# Patient Record
Sex: FEMALE | Race: WHITE | NOT HISPANIC OR LATINO | Employment: FULL TIME | URBAN - METROPOLITAN AREA
[De-identification: names, ages, dates, MRNs, and addresses within clinical notes are randomized per-mention and may not be internally consistent; named-entity substitution may affect disease eponyms.]

---

## 2022-09-14 ENCOUNTER — DOCUMENTATION ONLY (OUTPATIENT)
Dept: TRANSPLANT | Facility: CLINIC | Age: 32
End: 2022-09-14

## 2022-09-19 ENCOUNTER — DOCUMENTATION ONLY (OUTPATIENT)
Dept: TRANSPLANT | Facility: CLINIC | Age: 32
End: 2022-09-19

## 2022-09-22 ENCOUNTER — TELEPHONE (OUTPATIENT)
Dept: TRANSPLANT | Facility: CLINIC | Age: 32
End: 2022-09-22

## 2022-09-22 NOTE — TELEPHONE ENCOUNTER
Initial Independent Living Donor Advocate contact made with potential donor today.  I introduced myself and my role during the donation process, includin.  KHALIDA ROLE   The federal government requires that all licensed transplant centers provide the living donor with an Independent Living Donor Advocate (KHALIDA).  I do not meet recipients or attend meetings that discuss their care or decision to transplant them. My role is separate to avoid any conflict of interest.  My role is to ensure:  1) your rights are protected;  2) you get all the information you need from the transplant team to make a fully informed decision whether to donate;   3) that living donation is in your best interest.   4) that you have the right to decide NOT to go forward with living donation at any time during this process.  I am available to you throughout the workup, during surgery phase and follow-up at home.   2. WORKUP & PRIVACY     Your identity and workup are not shared with the recipient at any time.     There is a medical donor workup that consists of testing to determine if you are healthy enough to donate.  Workup tests include many blood draws, urine collection/ (kidney function testing), chest x-ray, EKG, CT scan. As you complete each step then you may move on to the next.  Workup can take as little or as long as you need and you can stop the process at any time.     Transplant is a treatment option, not a cure. A kidney from a living kidney donor can last 12-14 years.  Other treatment options are  donation and two types of dialysis.     This is major surgery and your estimated hospital stay is approximately 1-2 nights.  After surgery, there are driving and lifting restrictions - no driving for two weeks and no lifting over ten pounds for 8 weeks.  Donors are routinely off from work for 4 - 6 weeks after surgery, and potentially longer if they have a physical job.       If you anticipate lost wages due to donation, donor  wage reimbursement options may be available to you and will be reviewed with you during the evaluation process.      The recipient's insurance covers the medical expenses related to the donor evaluation and surgery.  However, it is important for you to carry your own health insurance to address any medical issues that are found and are NOT related to living donation.  3.  QUESTIONS  Have you received a packet from the transplant department?     Questions?    Have you discussed with anyone your potential decision to donate?   Yes.  Is anyone pressuring or coercing you to donate? No.  Have you discussed any financial arrangements with recipient around donating a kidney? No.  Are you aware that you can confidentially opt out at any time, up to and including day of donation? Yes.  At this time, would you like to proceed with the medical evaluation to see if you can be a kidney donor?  Yes.    If yes, the donor coordinator will be reaching out to you with next steps.     You can reach me or someone else on the KHALIDA team by calling [phone] Option 3.    KHALIDA NOTES: Lian wants to give co-worker and friend.  She is scheduled to talk to Daisy Guillory RN, her donor nurse coordinator at 1 pm on Monday Oct. 3    Duration of call 25 minutes

## 2022-10-03 ENCOUNTER — TELEPHONE (OUTPATIENT)
Dept: TRANSPLANT | Facility: CLINIC | Age: 32
End: 2022-10-03

## 2022-10-14 ENCOUNTER — DOCUMENTATION ONLY (OUTPATIENT)
Dept: TRANSPLANT | Facility: CLINIC | Age: 32
End: 2022-10-14

## 2022-10-14 ENCOUNTER — TELEPHONE (OUTPATIENT)
Dept: TRANSPLANT | Facility: CLINIC | Age: 32
End: 2022-10-14

## 2022-10-14 NOTE — TELEPHONE ENCOUNTER
LM for Lian asking her if she'd like to resched the RN phone visit.Let me know either way and we can sched appt.

## 2022-10-24 ENCOUNTER — TELEPHONE (OUTPATIENT)
Dept: TRANSPLANT | Facility: CLINIC | Age: 32
End: 2022-10-24

## 2022-10-24 DIAGNOSIS — Z00.5 TRANSPLANT DONOR EVALUATION: Primary | ICD-10-CM

## 2022-10-24 RX ORDER — MEPERIDINE HYDROCHLORIDE 25 MG/ML
25 INJECTION INTRAMUSCULAR; INTRAVENOUS; SUBCUTANEOUS EVERY 30 MIN PRN
Status: CANCELLED | OUTPATIENT
Start: 2022-12-01

## 2022-10-24 RX ORDER — ALBUTEROL SULFATE 90 UG/1
1-2 AEROSOL, METERED RESPIRATORY (INHALATION)
Status: CANCELLED
Start: 2022-12-01

## 2022-10-24 RX ORDER — ALBUTEROL SULFATE 0.83 MG/ML
2.5 SOLUTION RESPIRATORY (INHALATION)
Status: CANCELLED | OUTPATIENT
Start: 2022-12-01

## 2022-10-24 RX ORDER — EPINEPHRINE 1 MG/ML
0.3 INJECTION, SOLUTION, CONCENTRATE INTRAVENOUS EVERY 5 MIN PRN
Status: CANCELLED | OUTPATIENT
Start: 2022-12-01

## 2022-10-24 RX ORDER — DIPHENHYDRAMINE HYDROCHLORIDE 50 MG/ML
50 INJECTION INTRAMUSCULAR; INTRAVENOUS
Status: CANCELLED
Start: 2022-12-01

## 2022-10-24 RX ORDER — METHYLPREDNISOLONE SODIUM SUCCINATE 125 MG/2ML
125 INJECTION, POWDER, LYOPHILIZED, FOR SOLUTION INTRAMUSCULAR; INTRAVENOUS
Status: CANCELLED
Start: 2022-12-01

## 2022-10-24 NOTE — TELEPHONE ENCOUNTER
Contacted Lian Garcia to introduce myself and my role, review of medical/surgical/family history and next steps.     Lian Garcia  is aware She can stop donor evaluation at any time.    Have you ever been positive for COVID 19? Yes, oct 2020    Have you received the COVID vaccination series? yes     Reviewed risk of COVID-19 to living donors.    Regular blood donor? No Last blood donation date N/A (Notified donor to avoid blood donation at this time to get accurate blood counts if going through evaluation)     Lian Garcia is a 31 year old female  ABO unknown that would like to learn more about donation to a friend.     Concerns from medical/surgical/family history: none    Current medications and NSAID use:   Sertaline 100 mg daily  Spironolactone 25 mg daily -skin    Legal issues w/ law enforcement: none    Reviewed any history of travel in endemic areas: [place], yes  Strongyloides- [place], [place] and [place].  Trypanosoma cruzi ([place])- [place]  West Nile Virus- [place], [place], [place], [place] and [place].     Per our Phase 1 algorithm, does not meet criteria to do preliminary testing.     Reviewed evaluation testing: Iohexol, Lab work, CXR, EKG, Provider visits and functions, CT Angiogram.     Reviewed operations of selection committee and angio review meetings and the need for multidisciplinary input. Post-donation requirements include post-op appointment with your surgeon at 2 weeks after surgery, 6 week, 6 month, 1 year and 2 year lab tests.     Reviewed NKR listing and transfer of care to KPD team if approved. Provided Lian with NKR website to review.     Briefly went over options if approved of NDD and voucher donation.     Lian would like to proceed with next steps: rene     Confirmed that Lian reviewed Informed consent document and all questions answered.  Reviewed that they will receive Docusign to obtain electronic signature for the following: Informed consent, SRTR data,  RHIANNON for medical information, Auth for Electronic communication and will need their signed consent back before proceeding with evaluation.      Encouraged sign up for MyChart and reviewed importance of watching teaching videos prior to evaluation.    Verified recipient status if not NDD.    Donor timeline: TBD     Will send orders to scheduling team to set up for eval testing.

## 2022-11-30 LAB
A1 AG RBC QL: NEGATIVE
SPECIMEN EXPIRATION DATE: NORMAL

## 2022-12-01 ENCOUNTER — OFFICE VISIT (OUTPATIENT)
Dept: INFUSION THERAPY | Facility: CLINIC | Age: 32
End: 2022-12-01
Attending: INTERNAL MEDICINE

## 2022-12-01 ENCOUNTER — ALLIED HEALTH/NURSE VISIT (OUTPATIENT)
Dept: TRANSPLANT | Facility: CLINIC | Age: 32
End: 2022-12-01
Attending: INTERNAL MEDICINE

## 2022-12-01 ENCOUNTER — ANCILLARY PROCEDURE (OUTPATIENT)
Dept: GENERAL RADIOLOGY | Facility: CLINIC | Age: 32
End: 2022-12-01
Attending: INTERNAL MEDICINE

## 2022-12-01 ENCOUNTER — ANCILLARY PROCEDURE (OUTPATIENT)
Dept: CT IMAGING | Facility: CLINIC | Age: 32
End: 2022-12-01
Attending: INTERNAL MEDICINE

## 2022-12-01 ENCOUNTER — APPOINTMENT (OUTPATIENT)
Dept: TRANSPLANT | Facility: CLINIC | Age: 32
End: 2022-12-01
Attending: INTERNAL MEDICINE

## 2022-12-01 ENCOUNTER — LAB (OUTPATIENT)
Dept: LAB | Facility: CLINIC | Age: 32
End: 2022-12-01
Attending: INTERNAL MEDICINE

## 2022-12-01 ENCOUNTER — OFFICE VISIT (OUTPATIENT)
Dept: TRANSPLANT | Facility: CLINIC | Age: 32
End: 2022-12-01
Attending: INTERNAL MEDICINE

## 2022-12-01 ENCOUNTER — OFFICE VISIT (OUTPATIENT)
Dept: NEPHROLOGY | Facility: CLINIC | Age: 32
End: 2022-12-01
Attending: INTERNAL MEDICINE

## 2022-12-01 VITALS
OXYGEN SATURATION: 98 % | WEIGHT: 174 LBS | SYSTOLIC BLOOD PRESSURE: 104 MMHG | HEIGHT: 67 IN | BODY MASS INDEX: 27.31 KG/M2 | HEART RATE: 69 BPM | DIASTOLIC BLOOD PRESSURE: 67 MMHG

## 2022-12-01 VITALS
HEART RATE: 68 BPM | OXYGEN SATURATION: 99 % | SYSTOLIC BLOOD PRESSURE: 116 MMHG | RESPIRATION RATE: 16 BRPM | WEIGHT: 174.1 LBS | HEIGHT: 67 IN | TEMPERATURE: 97.6 F | BODY MASS INDEX: 27.33 KG/M2 | DIASTOLIC BLOOD PRESSURE: 77 MMHG

## 2022-12-01 DIAGNOSIS — Z00.5 TRANSPLANT DONOR EVALUATION: ICD-10-CM

## 2022-12-01 DIAGNOSIS — G43.109 MIGRAINE WITH AURA AND WITHOUT STATUS MIGRAINOSUS, NOT INTRACTABLE: ICD-10-CM

## 2022-12-01 DIAGNOSIS — Z72.0 TOBACCO ABUSE: ICD-10-CM

## 2022-12-01 DIAGNOSIS — Z00.5 TRANSPLANT DONOR EVALUATION: Primary | ICD-10-CM

## 2022-12-01 DIAGNOSIS — E78.00 PURE HYPERCHOLESTEROLEMIA: ICD-10-CM

## 2022-12-01 DIAGNOSIS — R51.9 RECURRENT HEADACHE: ICD-10-CM

## 2022-12-01 LAB
ABO/RH(D): NORMAL
ABO/RH(D): NORMAL
ALBUMIN MFR UR ELPH: 13.1 MG/DL
ALBUMIN SERPL BCG-MCNC: 4.3 G/DL (ref 3.5–5.2)
ALBUMIN UR-MCNC: 10 MG/DL
ALP SERPL-CCNC: 58 U/L (ref 35–104)
ALT SERPL W P-5'-P-CCNC: 14 U/L (ref 10–35)
ANION GAP SERPL CALCULATED.3IONS-SCNC: 8 MMOL/L (ref 7–15)
ANTIBODY SCREEN: NORMAL
APPEARANCE UR: CLEAR
APTT PPP: 26 SECONDS (ref 22–38)
AST SERPL W P-5'-P-CCNC: 15 U/L (ref 10–35)
BILIRUB SERPL-MCNC: 0.2 MG/DL
BILIRUB UR QL STRIP: NEGATIVE
BLOOD BANK CHART COMMENT: NORMAL
BUN SERPL-MCNC: 12.3 MG/DL (ref 6–20)
CALCIUM SERPL-MCNC: 8.9 MG/DL (ref 8.6–10)
CHLORIDE SERPL-SCNC: 103 MMOL/L (ref 98–107)
CHOLEST SERPL-MCNC: 216 MG/DL
CMV IGG SERPL IA-ACNC: <0.2 U/ML
CMV IGG SERPL IA-ACNC: NORMAL
COLOR UR AUTO: YELLOW
CREAT SERPL-MCNC: 0.7 MG/DL (ref 0.51–0.95)
CREAT UR-MCNC: 250 MG/DL
CREAT UR-MCNC: 250 MG/DL
DEPRECATED HCO3 PLAS-SCNC: 27 MMOL/L (ref 22–29)
EBV VCA IGG SER IA-ACNC: >750 U/ML
EBV VCA IGG SER IA-ACNC: POSITIVE
ERYTHROCYTE [DISTWIDTH] IN BLOOD BY AUTOMATED COUNT: 11.8 % (ref 10–15)
GFR SERPL CREATININE-BSD FRML MDRD: >90 ML/MIN/1.73M2
GLUCOSE SERPL-MCNC: 94 MG/DL (ref 70–99)
GLUCOSE UR STRIP-MCNC: NEGATIVE MG/DL
HBA1C MFR BLD: 5.1 %
HBV CORE AB SERPL QL IA: NONREACTIVE
HBV SURFACE AB SERPL IA-ACNC: 353.96 M[IU]/ML
HBV SURFACE AB SERPL IA-ACNC: REACTIVE M[IU]/ML
HBV SURFACE AG SERPL QL IA: NONREACTIVE
HCT VFR BLD AUTO: 44.7 % (ref 35–47)
HCV AB SERPL QL IA: NONREACTIVE
HDLC SERPL-MCNC: 67 MG/DL
HGB BLD-MCNC: 15 G/DL (ref 11.7–15.7)
HGB UR QL STRIP: NEGATIVE
HIV 1+2 AB+HIV1 P24 AG SERPL QL IA: NONREACTIVE
INR PPP: 0.95 (ref 0.85–1.15)
KETONES UR STRIP-MCNC: NEGATIVE MG/DL
LDLC SERPL CALC-MCNC: 133 MG/DL
LEUKOCYTE ESTERASE UR QL STRIP: NEGATIVE
MCH RBC QN AUTO: 30.1 PG (ref 26.5–33)
MCHC RBC AUTO-ENTMCNC: 33.6 G/DL (ref 31.5–36.5)
MCV RBC AUTO: 90 FL (ref 78–100)
MICROALBUMIN UR-MCNC: <12 MG/L
MICROALBUMIN/CREAT UR: NORMAL MG/G{CREAT}
MUCOUS THREADS #/AREA URNS LPF: PRESENT /LPF
NITRATE UR QL: NEGATIVE
NONHDLC SERPL-MCNC: 149 MG/DL
PH UR STRIP: 5.5 [PH] (ref 5–7)
PHOSPHATE SERPL-MCNC: 3 MG/DL (ref 2.5–4.5)
PLATELET # BLD AUTO: 353 10E3/UL (ref 150–450)
POTASSIUM SERPL-SCNC: 4.1 MMOL/L (ref 3.4–5.3)
PROT SERPL-MCNC: 7.3 G/DL (ref 6.4–8.3)
PROT/CREAT 24H UR: 0.05 MG/MG CR (ref 0–0.2)
RBC # BLD AUTO: 4.99 10E6/UL (ref 3.8–5.2)
RBC URINE: <1 /HPF
SODIUM SERPL-SCNC: 138 MMOL/L (ref 136–145)
SP GR UR STRIP: 1.03 (ref 1–1.03)
SPECIMEN EXPIRATION DATE: NORMAL
SPECIMEN EXPIRATION DATE: NORMAL
SQUAMOUS EPITHELIAL: 4 /HPF
T PALLIDUM AB SER QL: NONREACTIVE
TRIGL SERPL-MCNC: 79 MG/DL
URATE SERPL-MCNC: 3.1 MG/DL (ref 2.4–5.7)
UROBILINOGEN UR STRIP-MCNC: NORMAL MG/DL
WBC # BLD AUTO: 6.9 10E3/UL (ref 4–11)
WBC URINE: 2 /HPF

## 2022-12-01 PROCEDURE — 81001 URINALYSIS AUTO W/SCOPE: CPT

## 2022-12-01 PROCEDURE — 80053 COMPREHEN METABOLIC PANEL: CPT

## 2022-12-01 PROCEDURE — 86789 WEST NILE VIRUS ANTIBODY: CPT

## 2022-12-01 PROCEDURE — 86803 HEPATITIS C AB TEST: CPT

## 2022-12-01 PROCEDURE — 86644 CMV ANTIBODY: CPT

## 2022-12-01 PROCEDURE — 83036 HEMOGLOBIN GLYCOSYLATED A1C: CPT

## 2022-12-01 PROCEDURE — 85730 THROMBOPLASTIN TIME PARTIAL: CPT

## 2022-12-01 PROCEDURE — 87340 HEPATITIS B SURFACE AG IA: CPT

## 2022-12-01 PROCEDURE — 84100 ASSAY OF PHOSPHORUS: CPT

## 2022-12-01 PROCEDURE — 99207 PR NO CHARGE COORDINATED CARE PS: CPT | Performed by: SOCIAL WORKER

## 2022-12-01 PROCEDURE — 86665 EPSTEIN-BARR CAPSID VCA: CPT

## 2022-12-01 PROCEDURE — 84156 ASSAY OF PROTEIN URINE: CPT

## 2022-12-01 PROCEDURE — 36415 COLL VENOUS BLD VENIPUNCTURE: CPT

## 2022-12-01 PROCEDURE — 36415 COLL VENOUS BLD VENIPUNCTURE: CPT | Performed by: INTERNAL MEDICINE

## 2022-12-01 PROCEDURE — 99207 PR NO CHARGE COORDINATED CARE PS: CPT

## 2022-12-01 PROCEDURE — 86901 BLOOD TYPING SEROLOGIC RH(D): CPT

## 2022-12-01 PROCEDURE — 81378 HLA I & II TYPING HR: CPT

## 2022-12-01 PROCEDURE — 86905 BLOOD TYPING RBC ANTIGENS: CPT

## 2022-12-01 PROCEDURE — 86704 HEP B CORE ANTIBODY TOTAL: CPT

## 2022-12-01 PROCEDURE — 99205 OFFICE O/P NEW HI 60 MIN: CPT | Performed by: SURGERY

## 2022-12-01 PROCEDURE — G0463 HOSPITAL OUTPT CLINIC VISIT: HCPCS

## 2022-12-01 PROCEDURE — 85027 COMPLETE CBC AUTOMATED: CPT

## 2022-12-01 PROCEDURE — 86481 TB AG RESPONSE T-CELL SUSP: CPT

## 2022-12-01 PROCEDURE — 84550 ASSAY OF BLOOD/URIC ACID: CPT

## 2022-12-01 PROCEDURE — 255N000002 HC RX 255 OP 636: Performed by: INTERNAL MEDICINE

## 2022-12-01 PROCEDURE — 74175 CTA ABDOMEN W/CONTRAST: CPT | Mod: GC | Performed by: RADIOLOGY

## 2022-12-01 PROCEDURE — 86706 HEP B SURFACE ANTIBODY: CPT

## 2022-12-01 PROCEDURE — 36415 COLL VENOUS BLD VENIPUNCTURE: CPT | Performed by: DIETITIAN, REGISTERED

## 2022-12-01 PROCEDURE — 85610 PROTHROMBIN TIME: CPT

## 2022-12-01 PROCEDURE — 71046 X-RAY EXAM CHEST 2 VIEWS: CPT | Performed by: RADIOLOGY

## 2022-12-01 PROCEDURE — 82542 COL CHROMOTOGRAPHY QUAL/QUAN: CPT | Performed by: INTERNAL MEDICINE

## 2022-12-01 PROCEDURE — 86780 TREPONEMA PALLIDUM: CPT

## 2022-12-01 PROCEDURE — G0463 HOSPITAL OUTPT CLINIC VISIT: HCPCS | Mod: 25

## 2022-12-01 PROCEDURE — 99205 OFFICE O/P NEW HI 60 MIN: CPT | Performed by: INTERNAL MEDICINE

## 2022-12-01 PROCEDURE — 82043 UR ALBUMIN QUANTITATIVE: CPT

## 2022-12-01 PROCEDURE — 80061 LIPID PANEL: CPT

## 2022-12-01 RX ORDER — MEPERIDINE HYDROCHLORIDE 25 MG/ML
25 INJECTION INTRAMUSCULAR; INTRAVENOUS; SUBCUTANEOUS EVERY 30 MIN PRN
Status: CANCELLED | OUTPATIENT
Start: 2022-12-01

## 2022-12-01 RX ORDER — DIPHENHYDRAMINE HYDROCHLORIDE 50 MG/ML
50 INJECTION INTRAMUSCULAR; INTRAVENOUS
Status: CANCELLED
Start: 2022-12-01

## 2022-12-01 RX ORDER — ALBUTEROL SULFATE 0.83 MG/ML
2.5 SOLUTION RESPIRATORY (INHALATION)
Status: CANCELLED | OUTPATIENT
Start: 2022-12-01

## 2022-12-01 RX ORDER — IOPAMIDOL 755 MG/ML
100 INJECTION, SOLUTION INTRAVASCULAR ONCE
Status: COMPLETED | OUTPATIENT
Start: 2022-12-01 | End: 2022-12-01

## 2022-12-01 RX ORDER — ALBUTEROL SULFATE 90 UG/1
1-2 AEROSOL, METERED RESPIRATORY (INHALATION)
Status: CANCELLED
Start: 2022-12-01

## 2022-12-01 RX ORDER — METHYLPREDNISOLONE SODIUM SUCCINATE 125 MG/2ML
125 INJECTION, POWDER, LYOPHILIZED, FOR SOLUTION INTRAMUSCULAR; INTRAVENOUS
Status: CANCELLED
Start: 2022-12-01

## 2022-12-01 RX ORDER — EPINEPHRINE 1 MG/ML
0.3 INJECTION, SOLUTION INTRAMUSCULAR; SUBCUTANEOUS EVERY 5 MIN PRN
Status: CANCELLED | OUTPATIENT
Start: 2022-12-01

## 2022-12-01 RX ORDER — SERTRALINE HYDROCHLORIDE 100 MG/1
100 TABLET, FILM COATED ORAL
COMMUNITY
Start: 2020-06-30

## 2022-12-01 RX ADMIN — IOPAMIDOL 100 ML: 755 INJECTION, SOLUTION INTRAVASCULAR at 11:58

## 2022-12-01 RX ADMIN — IOHEXOL 4 ML: 350 INJECTION, SOLUTION INTRAVENOUS at 07:34

## 2022-12-01 ASSESSMENT — PAIN SCALES - GENERAL: PAINLEVEL: NO PAIN (0)

## 2022-12-01 NOTE — NURSING NOTE
Saw Lian in clinic on 22 for Living Kidney Donor Evaluation.     Lian is interested in donation for a friend.    I provided a folder which included copies of the followin. Living Kidney Donor Evaluation Consent  2. Paired Exchange Consent  3. Donor Shield Pamphlet  4. Living Donor Collective Study information  5. Kidney for Life pamphlet  6. Kidney Donors are Heroes! Study synopsis  7. Most current SRTR data.      I also provided a parking pass and food voucher.      I reviewed the Living Kidney Donor Evaluation Consent, dated 2020 and Paired Exchange/ NDD consent dated 2018.  I answered any question.    Evaluation Notes:  1. Tissue typing completed and sent  2. Will need neurology for headaches & migraines

## 2022-12-01 NOTE — NURSING NOTE
Chief Complaint   Patient presents with     Blood Draw     Labs drawn with piv start by rn.  VS taken.     Labs drawn with PIV start by rn.  Pt tolerated well.  VS taken and pt checked in for next appt.    Mary Rao RN

## 2022-12-01 NOTE — LETTER
Date:December 1, 2022      Provider requested that no letter be sent. Do not send.       [facility]

## 2022-12-01 NOTE — LETTER
12/1/2022         RE: Lian Garcia  [address] [place] [address]        Dear Colleague,    Thank you for referring your patient, Lian Garcia, to the [facility]. Please see a copy of my visit note below.    [facility]  Consult Note     Medical record number: [id number]  YOB: 1990,     Date of Visit:  12/01/2022  Consult requested by the patient for evaluation of kidney donation candidacy.    30 yo planning to donate to friend  Helathy - on Sertraline for depression'  No abd surgery  Smoke 1/2 pack every couple of days over last 2 years. Started since age 16  Works at [employer]  Open to paired donation/advanced     Abd profile favorable    Risks of the surgical procedure including but not limited to the rare risk of mortality discussed in detail. Patient verbalized good understanding and had several pertinent questions which were answered satisfactorily.     Good candidate for donation            Assessment and Recommendations: Ms. Garcia appears to be a good candidate for kidney donation at this point in the evaluation. The following issues will need to be addressed prior to formal review:    Iohexol ordered for today for assessment of adequate kidney function for donation. Will be reviewed when resulted  Donor labs ordered for today and reviewed to include: CBC, CMP, Mg, PO4, hemoglobin A1c, lipid panel, blood type x 2, hemoglobin A1c, UA with microscopic, urine culture, urine protein/cr, INR/PTT, Quantiferon gold, hepatitis C (antibody), hepatitis B panel, HIV, treponemia, West Nile (antibody panel), CMV (antibody panel), EBV (antibody panel), pregnancy screen (for females of childbearing age), PSA (males >50yrs), HLA tissue typing, buccal swab for eplet typing  Social work consult ordered  Dietician consult ordered  Transplant nephrology consult ordered  Transplant coordinator consult ordered  KHALIDA (independent living donor  sujit) consult ordered  EKG ordered for today and will be reviewed when resulted. Suitable to proceed today with this testing today:  Yes   Chest x-ray ordered for today and will be reviewed when resulted. Suitable to proceed with this testing today:  Yes   CT angio of abdomen and pelvis for anatomical assessment. Images and report to be reviewed when resulted.  Suitable to proceed with this testing today:  Yes  After review of the above, additional testing/concerns include:  none    The majority of our visit today was spent in counseling regarding the medical and surgical risks of kidney donation, the typical markos-and post-operative experience and recovery/return to work pattern.  We also talked about post-op visits and longer term health care maintenance, as well as the implications of having one remaining kidney. This discussion included, but was not limited to rates of complications such as bleeding, infection, need for transfusion, reoperation, other organ injury, future bowel obstruction, incisional hernia, port site pain, varicocele, venous thrombosis, pulmonary embolism, renal failure, and death (3 per 10,000). At the conclusion of the visit, all questions had been answered and Ms. Garcia's candidacy for donation will be reviewed at our Multidisciplinary Donor Selection Committee.     60 min spent on the date of the encounter in chart review, patient visit,  documentation and/or discussion with other providers about the issues documented above.    .    ---------------------------------------------------------------------------------------------------    HPI: Ms. Garcia wishes to donate a kidney to friend.           NO  Personal history of cancer    []         Comment:     Personal history of kidney problems   []         Comment:   Personal history of diabetes    []         Comment:                  Bladder emptying problems (prostate, urinary retention) []         Comment:   Neck or Back problems:     []          Comment:   Constipation      []         Comment:       Frequent NSAID use:         []         Comment:       Other:        []         Comment:                Past Medical History:   Diagnosis Date     Anxiety      Depression      Henoch-Schonlein purpura (H)     Had when she was younger     Migraine      Past Surgical History:   Procedure Laterality Date     TONSILLECTOMY  2000     Family History   Problem Relation Age of Onset     Glaucoma Mother      Asthma Father      Hypertension Father      Myocardial Infarction Father      Chronic Obstructive Pulmonary Disease Father      Thyroid Disease Father      Anxiety Disorder Sister      Depression Sister      Anxiety Disorder Sister      Depression Sister      No Known Problems Maternal Grandmother      Unknown/Adopted Maternal Grandfather      Alcoholism Paternal Grandmother      Depression Paternal Grandmother      Depression Paternal Grandfather      Myocardial Infarction Paternal Grandfather      Alcoholism Paternal Grandfather      Social History     Socioeconomic History     Marital status: Single     Spouse name: Not on file     Number of children: Not on file     Years of education: Not on file     Highest education level: Not on file   Occupational History     Not on file   Tobacco Use     Smoking status: Every Day     Packs/day: 0.50     Types: Cigarettes     Start date: 2010     Smokeless tobacco: Never   Substance and Sexual Activity     Alcohol use: Yes     Comment: 5 drinks a year     Drug use: Not Currently     Sexual activity: Not on file   Other Topics Concern     Not on file   Social History Narrative     Not on file     Social Determinants of Health     Financial Resource Strain: Not on file   Food Insecurity: Not on file   Transportation Needs: Not on file   Physical Activity: Not on file   Stress: Not on file   Social Connections: Not on file   Intimate Partner Violence: Not on file   Housing Stability: Not on file       ROS:   CONSTITUTIONAL:  No  fevers or chills  EYES: negative for icterus  ENT:  negative for hearing loss, tinnitus and sore throat  RESPIRATORY:  negative for cough, sputum, dyspnea  CARDIOVASCULAR:  negative for chest pain  GASTROINTESTINAL:  negative for nausea, vomiting, diarrhea or constipation  GENITOURINARY:  negative for incontinence, dysuria, bladder emptying problems  HEME:  No easy bruising  INTEGUMENT:  negative for rash and pruritus  NEURO:  Negative for headache, seizure disorder    Allergies:   Allergies   Allergen Reactions     Amoxicillin Rash     Latex Rash       Medications:  Prescription Medications as of 12/1/2022       Rx Number Disp Refills Start End Last Dispensed Date Next Fill Date Owning Pharmacy    doxylamine (UNISOM) 25 MG TABS tablet            Sig: Take 25 mg by mouth At Bedtime    Class: Historical    Route: Oral    sertraline (ZOLOFT) 100 MG tablet    6/30/2020        Sig: Take 100 mg by mouth    Class: Historical    Route: Oral      Clinic-Administered Medications as of 12/1/2022       Dose Frequency Start End    iohexol (OMNIPAQUE) 350 MG/ML injectable solution 4 mL (Completed) 4 mL ONCE 12/1/2022 12/1/2022    Route: Intravenous        Exam:   Temp:  [97.6  F (36.4  C)] 97.6  F (36.4  C)  Pulse:  [68-69] 69  Resp:  [16] 16  BP: (104-121)/(67-89) 104/67  SpO2:  [98 %-99 %] 98 %  Appearance: in no apparent distress.   Skin: normal  Head and Neck: Normal, no rashes or jaundice  Respiratory: normal respiratory excursions, no audible wheeze  Cardiovascular: RRR  Abdomen: rounded, No surgical scars   Extremeties: Edema, none  Neuro: without deficit       Labs:   ABO: unknown  Chemistries:   Recent Labs   Lab Test 12/01/22  0644      POTASSIUM 4.1   CHLORIDE 103   CO2 27   ANIONGAP 8   GLC 94   BUN 12.3   CR 0.70   VILLA 8.9       Urine Studies:   Recent Labs   Lab Test 12/01/22  0644   COLOR Yellow   APPEARANCE Clear   URINEGLC Negative   URINEBILI Negative   URINEKETONE Negative   SG 1.032   UBLD Negative    URINEPH 5.5   PROTEIN 10*   NITRITE Negative   LEUKEST Negative   RBCU <1   WBCU 2     Recent Labs   Lab Test 12/01/22  0644   MICROL <12.0       Hematology:      Recent Labs   Lab Test 12/01/22  0644   WBC 6.9   RBC 4.99   HGB 15.0   HCT 44.7   MCV 90   MCH 30.1   MCHC 33.6   RDW 11.8          Coags:   Recent Labs   Lab Test 12/01/22  0644   INR 0.95       Lipid Profile:   Cholesterol   Date Value Ref Range Status   12/01/2022 216 (H) <200 mg/dL Final     Triglycerides   Date Value Ref Range Status   12/01/2022 79 <150 mg/dL Final     Direct Measure HDL   Date Value Ref Range Status   12/01/2022 67 >=50 mg/dL Final     LDL Cholesterol Calculated   Date Value Ref Range Status   12/01/2022 133 (H) <=100 mg/dL Final     Non HDL Cholesterol   Date Value Ref Range Status   12/01/2022 149 (H) <130 mg/dL Final       Virals:  CMV and EBV pending.   No lab results found.   No results found for: HCVAB, HQTG, HCGENO, HCPCR, HQTRNA, HEPRNA, CRYOG    No results found for: HCVAB, HBSAB, HBSAG          Again, thank you for allowing me to participate in the care of your patient.        Sincerely,        Genesis Cardona MD

## 2022-12-01 NOTE — PROGRESS NOTES
Iohexol Timed Test Nursing Note    Lian Garcia comes to [facility] today for a Iohexol GFR Timed test.   Orders from Nadia Long MD were completed.    Progress Note      The following information was verified with the patient:  *Female patients are not pregnant or could not have become recently pregnant: No  *Is there a history of allergy (skin rash, swelling, ect) to :   a. Iodine (except skin reactions to Betadine): NO   b. Intravenous radio-contrast agents: NO   c. Seafood: NO     RN provided patient with educational handout regarding timed test. YES    Medication administered :   Iohexol (Omnipaque 300 mg Iodine/ ml concentration) 5 mls.  Site administered Left lower arm  Start [id number]  Stop [id number]          Patient tolerated the procedure well    Vitals were reviewed   /77 (BP Location: Right arm, Patient Position: Sitting, Cuff Size: Adult Regular)   Pulse 68   Temp 97.6  F (36.4  C) (Oral)   Resp 16   Wt 79 kg (174 lb 1.6 oz)   SpO2 99%     Discharge Plan    Discharge instructions reviewed with patient: YES  Discharge papers printed and given to patient: YES  Patient/Representative verbalized understanding, all questions answered: YES    Deb Millard RN

## 2022-12-01 NOTE — PROGRESS NOTES
TRANSPLANT NEPHROLOGY DONOR EVALUATION    Assessment and Plan:  # Prospective Kidney Transplant Donor: Patient with one issue that needs to be addressed prior to donation. Patient's blood pressure is acceptable at this visit, kidney function appears to be acceptable with Iohexol pending, and urinalysis is bland.   -Recommend neurology evaluation due to headaches and migraines to consider ppx agent to prevent use of NSAIDs. She is getting almost weekly headaches that do not have a known etiology.    -Consider echo due to LE edema, although non pitting. No liver disease, serum albumin normal, no proteinuria.     # Tobacco abuse:    -Has smoked 1 pack per week for the past 10 years.    -Recommend cessation   -I do not think PFTs are necessary due to short duration of smoking, young age, no functional limitations, normal lung exam    # History of HSP as child:   -Had at age 8, remembers it being painful, couldn't stand up but doesn't remember if there was kidney disease with it. Has not recurred   -Given that she has no hematuria or proteinuria, is now 24y past this, I think it is unlikely that she has IgAN at this point. Should not preclude donation    # LE edema:   -dependent edema, occurring since she was a child. No proteinuria, never has had an echo.    -Recommend echo    # HLD:   -T cholesterol 216 with     # Headaches:   -Has a history of migraines which she gets once every 3 months. Ibuprofen typically takes care of this.    -She gets a headache once every 2 weeks associated with weather changes for which she takes ibuprofen.    -Recommend referral to neurology headache specialist for propylaxis against headaches     # Acne:   -Took spironolactone 25mg daily for 1.5y, stopped 1m ago.     # Depression:   -Stable, on sertraline. No hospitalizations, no suicidal ideations    # Healthcare maintenance:   -Pap smear: negative 11/30/2020, repeat 11/2023    Discussed the risks of donating a kidney, including the  surgical risk and the possible risks of living with one kidney.    Education about expected post-donation kidney function and how chronic kidney disease (CKD) and end stage kidney disease (ESKD) might potentially impact the donor in the future, include, but not limited to:       - On average, donors will have 25-35% permanent loss of kidney function at donation.       - Baseline risk of ESKD may slightly exceed that of members of the general population with the same demographic profile.       - Donor risks must be interpreted in light of known epidemiology of both CKD or ESKD, such as that CKD generally develops in midlife (40-50 years old) and ESKD generally develops after age 60.       - Medical evaluation of young potential donors cannot predict lifetime risk of CKD or ESKD.       - Donors may be at higher risk for CKD if they sustain damage to the remaining kidney.       - Development of CKD and progression of ESKD may be more rapid with only 1 kidney.       - Some type of kidney replacement therapy, either kidney transplant or dialysis, is required when reaching ESKD.    Potential medical or surgical risks include, but not limited to:       - Death.       - Scars, pain, fatigue, and other consequences typical of any surgical procedure.       - Decreased kidney function.       - Abdominal or bowel symptoms, such as bloating and nausea, and developing bowel obstruction.       - Kidney failure (ESKD) and the need for a kidney transplant or dialysis for the donor.       - Impact of obesity, hypertension, or other donor-specific medical conditions on morbidity and mortality of the potential donor.    Patients overall evaluation will be discussed with the transplant team and a final recommendation on the patients' suitability to be a kidney transplant donor will be made at that time.    Consult:  Lian Garcia was seen in consultation at the request of Dr. Genesis Cardona for evaluation as a potential kidney transplant  "donor.    Reason for Visit:  Lian Garcia is a 31 year old female who presents for a kidney donor evaluation.  Patient would like to donate to a friend who is on dialysis .    Present Condition and Donor-Related Medical History:    Ms. Garcia is a 31yF w/ PMH of tobacco abuse (smokes 1/2 pack per day for the past 10 years), depression on sertraline, migraines, HSP at ~ age 8, acne for which she used to take spironolactone 25mg daily up until a month ago presenting for evaluation to be a living donor.     She notes that she gets headaches about once every 2 weeks and a migraine once every 3 months and for both of which she takes ibuprofen. She has a history of LE edema that is dependent and she wears compression stockings. No history of varicose veins.     She notes that she has GERD for which she takes ranitidine or tums PRN.  She does occasionally feel like food \"gets stuck\". This \"comes and goes\" and can occur every few months.          Kidney Disease Hx:       h/o Kidney Problems: No  Family h/o Genetic Kidney Disease: No       h/o Hypertension: No   Usual Blood Pressure: 110 systolic       h/o Protein in Urine: No  h/o Blood in Urine: No       h/o Kidney Stones: No  h/o Kidney Injury: No       h/o Recurrent UTI: No  h/o Genitourinary Problems: No       h/o Chronic NSAID Use: Yes, using about once weekly.          Other Medical Hx:       h/o Diabetes: No             h/o Gastrointestinal, Pancreas or Liver Problems: No       h/o Lung or Heart Problems: No       h/o Hematologic Problems: No  h/o Bleeding or Clotting Problems: No       h/o Cancer: No       h/o Infection Problems: No         Skin Cancer Risk:       h/o more than 50 moles: No       h/o extensive sun exposure: No       h/o melanoma: No       Family h/o melanoma: Does not know: parents have had some type of skin cancers but pretty sure it wasn't melanoma         Mental Health Assessment:       h/o Depression: Yes: managed on sertraline for which she has " "been on for 3 years       h/o Psychiatric Illness: No       h/o Suicidal Attempt(s): No    COVID Status:  Vaccination Up To Date: No, due for next dose  H/o COVID Infection: Yes, 10/2020    Review Of Systems:   A comprehensive review of systems was obtained and negative, except as noted in the HPI or PMH.    Past Medical History:   History was taken from the patient as noted below.  Past Medical History:   Diagnosis Date     Anxiety      Depression      Henoch-Schonlein purpura (H)     Had when she was younger     Migraine        Past Social History:   Past Surgical History:   Procedure Laterality Date     TONSILLECTOMY  2000     Personal or family history of anesthesia problems: Yes: per her history after tonsillectomy she was \"difficult to wake up\"    Family History:   Family History   Problem Relation Age of Onset     Glaucoma Mother      Asthma Father      Hypertension Father      Myocardial Infarction Father      Chronic Obstructive Pulmonary Disease Father      Thyroid Disease Father      Anxiety Disorder Sister      Depression Sister      Anxiety Disorder Sister      Depression Sister      No Known Problems Maternal Grandmother      Unknown/Adopted Maternal Grandfather      Alcoholism Paternal Grandmother      Depression Paternal Grandmother      Depression Paternal Grandfather      Myocardial Infarction Paternal Grandfather      Alcoholism Paternal Grandfather           Specific Family History in First Degree Relatives:       FH of Kidney Dz: No FH of Diabetes: No       FH of Hypertension: Yes, father   FH of CAD: Yes, father with MI       FH of Cancer: No  FH of Kidney Cancer: No    Personal History:   Social History     Socioeconomic History     Marital status: Single     Spouse name: Not on file     Number of children: Not on file     Years of education: Not on file     Highest education level: Not on file   Occupational History     Not on file   Tobacco Use     Smoking status: Every Day     Packs/day: " "0.50     Types: Cigarettes     Start date: 2010     Smokeless tobacco: Never   Substance and Sexual Activity     Alcohol use: Yes     Comment: 5 drinks a year     Drug use: Not Currently     Sexual activity: Not on file   Other Topics Concern     Not on file   Social History Narrative     Not on file     Social Determinants of Health     Financial Resource Strain: Not on file   Food Insecurity: Not on file   Transportation Needs: Not on file   Physical Activity: Not on file   Stress: Not on file   Social Connections: Not on file   Intimate Partner Violence: Not on file   Housing Stability: Not on file          Specific Social History:       Health Insurance Status: Yes       Employment Status: Full time  Occupation: Works at [employer]                        Living Arrangements: lives with significant other       Social Support: Yes       Presence of increased risk for disease transmission behaviors as defined by PHS guidelines: No        Allergies:  Allergies   Allergen Reactions     Amoxicillin Rash     Latex Rash       Medications:  Current Outpatient Medications   Medication Sig     doxylamine (UNISOM) 25 MG TABS tablet Take 25 mg by mouth At Bedtime     sertraline (ZOLOFT) 100 MG tablet Take 100 mg by mouth     No current facility-administered medications for this visit.     There are no discontinued medications.      Vitals:  Vital Signs 12/1/2022 12/1/2022 12/1/2022   Systolic 121 105 104   Diastolic 89 76 67   Pulse 69 - -   Temperature - - -   Respirations - - -   Weight (LB) 174 lb - -   Height 5' 7\" - -   BMI (Calculated) 27.25 - -   Pain Score - - -   O2 98 - -       Exam:   GENERAL APPEARANCE: alert and no distress  HENT: mouth without ulcers or lesions  LYMPHATICS: no cervical or supraclavicular nodes  RESP: lungs clear to auscultation - no rales, rhonchi or wheezes  CV: regular rhythm, normal rate, no rub, no murmur  EDEMA: no LE edema bilaterally  ABDOMEN: soft, nondistended, nontender, bowel " sounds normal  MS: extremities normal - no gross deformities noted, no evidence of inflammation in joints, no muscle tenderness  SKIN: no rash  NEURO: normal strength and tone, sensory exam grossly normal, mentation intact and speech normal  PSYCH: mentation appears normal and affect normal/bright    Results:   Labs and imaging were ordered for this visit and reviewed by me.  No results found for this or any previous visit (from the past 336 hour(s)).

## 2022-12-01 NOTE — LETTER
12/1/2022       RE: Lian Garcia  [address] [place] [address]     Dear Colleague,    Thank you for referring your patient, Lian Garcia, to the [facility]. Please see a copy of my visit note below.    TRANSPLANT NEPHROLOGY DONOR EVALUATION    Assessment and Plan:  # Prospective Kidney Transplant Donor: Patient with one issue that needs to be addressed prior to donation. Patient's blood pressure is acceptable at this visit, kidney function appears to be acceptable with Iohexol pending, and urinalysis is bland.   -Recommend neurology evaluation due to headaches and migraines to consider ppx agent to prevent use of NSAIDs. She is getting almost weekly headaches that do not have a known etiology.    -Consider echo due to LE edema, although non pitting. No liver disease, serum albumin normal, no proteinuria.     # Tobacco abuse:    -Has smoked 1 pack per week for the past 10 years.    -Recommend cessation   -I do not think PFTs are necessary due to short duration of smoking, young age, no functional limitations, normal lung exam    # History of HSP as child:   -Had at age 8, remembers it being painful, couldn't stand up but doesn't remember if there was kidney disease with it. Has not recurred   -Given that she has no hematuria or proteinuria, is now 24y past this, I think it is unlikely that she has IgAN at this point. Should not preclude donation    # LE edema:   -dependent edema, occurring since she was a child. No proteinuria, never has had an echo.    -Recommend echo    # HLD:   -T cholesterol 216 with     # Headaches:   -Has a history of migraines which she gets once every 3 months. Ibuprofen typically takes care of this.    -She gets a headache once every 2 weeks associated with weather changes for which she takes ibuprofen.    -Recommend referral to neurology headache specialist for propylaxis  against headaches     # Acne:   -Took spironolactone 25mg daily for 1.5y, stopped 1m ago.     # Depression:   -Stable, on sertraline. No hospitalizations, no suicidal ideations    # Healthcare maintenance:   -Pap smear: negative 11/30/2020, repeat 11/2023    Discussed the risks of donating a kidney, including the surgical risk and the possible risks of living with one kidney.    Education about expected post-donation kidney function and how chronic kidney disease (CKD) and end stage kidney disease (ESKD) might potentially impact the donor in the future, include, but not limited to:       - On average, donors will have 25-35% permanent loss of kidney function at donation.       - Baseline risk of ESKD may slightly exceed that of members of the general population with the same demographic profile.       - Donor risks must be interpreted in light of known epidemiology of both CKD or ESKD, such as that CKD generally develops in midlife (40-50 years old) and ESKD generally develops after age 60.       - Medical evaluation of young potential donors cannot predict lifetime risk of CKD or ESKD.       - Donors may be at higher risk for CKD if they sustain damage to the remaining kidney.       - Development of CKD and progression of ESKD may be more rapid with only 1 kidney.       - Some type of kidney replacement therapy, either kidney transplant or dialysis, is required when reaching ESKD.    Potential medical or surgical risks include, but not limited to:       - Death.       - Scars, pain, fatigue, and other consequences typical of any surgical procedure.       - Decreased kidney function.       - Abdominal or bowel symptoms, such as bloating and nausea, and developing bowel obstruction.       - Kidney failure (ESKD) and the need for a kidney transplant or dialysis for the donor.       - Impact of obesity, hypertension, or other donor-specific medical conditions on morbidity and mortality of the potential  "donor.    Patients overall evaluation will be discussed with the transplant team and a final recommendation on the patients' suitability to be a kidney transplant donor will be made at that time.    Consult:  Lian Garcia was seen in consultation at the request of Dr. Genesis Cardona for evaluation as a potential kidney transplant donor.    Reason for Visit:  Lian Garcia is a 31 year old female who presents for a kidney donor evaluation.  Patient would like to donate to a friend who is on dialysis .    Present Condition and Donor-Related Medical History:    Ms. Garcia is a 31yF w/ PMH of tobacco abuse (smokes 1/2 pack per day for the past 10 years), depression on sertraline, migraines, HSP at ~ age 8, acne for which she used to take spironolactone 25mg daily up until a month ago presenting for evaluation to be a living donor.     She notes that she gets headaches about once every 2 weeks and a migraine once every 3 months and for both of which she takes ibuprofen. She has a history of LE edema that is dependent and she wears compression stockings. No history of varicose veins.     She notes that she has GERD for which she takes ranitidine or tums PRN.  She does occasionally feel like food \"gets stuck\". This \"comes and goes\" and can occur every few months.          Kidney Disease Hx:       h/o Kidney Problems: No  Family h/o Genetic Kidney Disease: No       h/o Hypertension: No   Usual Blood Pressure: 110 systolic       h/o Protein in Urine: No  h/o Blood in Urine: No       h/o Kidney Stones: No  h/o Kidney Injury: No       h/o Recurrent UTI: No  h/o Genitourinary Problems: No       h/o Chronic NSAID Use: Yes, using about once weekly.          Other Medical Hx:       h/o Diabetes: No             h/o Gastrointestinal, Pancreas or Liver Problems: No       h/o Lung or Heart Problems: No       h/o Hematologic Problems: No  h/o Bleeding or Clotting Problems: No       h/o Cancer: No       h/o Infection Problems: No         " "Skin Cancer Risk:       h/o more than 50 moles: No       h/o extensive sun exposure: No       h/o melanoma: No       Family h/o melanoma: Does not know: parents have had some type of skin cancers but pretty sure it wasn't melanoma         Mental Health Assessment:       h/o Depression: Yes: managed on sertraline for which she has been on for 3 years       h/o Psychiatric Illness: No       h/o Suicidal Attempt(s): No    COVID Status:  Vaccination Up To Date: No, due for next dose  H/o COVID Infection: Yes, 10/2020    Review Of Systems:   A comprehensive review of systems was obtained and negative, except as noted in the HPI or PMH.    Past Medical History:   History was taken from the patient as noted below.  Past Medical History:   Diagnosis Date     Anxiety      Depression      Henoch-Schonlein purpura (H)     Had when she was younger     Migraine        Past Social History:   Past Surgical History:   Procedure Laterality Date     TONSILLECTOMY  2000     Personal or family history of anesthesia problems: Yes: per her history after tonsillectomy she was \"difficult to wake up\"    Family History:   Family History   Problem Relation Age of Onset     Glaucoma Mother      Asthma Father      Hypertension Father      Myocardial Infarction Father      Chronic Obstructive Pulmonary Disease Father      Thyroid Disease Father      Anxiety Disorder Sister      Depression Sister      Anxiety Disorder Sister      Depression Sister      No Known Problems Maternal Grandmother      Unknown/Adopted Maternal Grandfather      Alcoholism Paternal Grandmother      Depression Paternal Grandmother      Depression Paternal Grandfather      Myocardial Infarction Paternal Grandfather      Alcoholism Paternal Grandfather           Specific Family History in First Degree Relatives:       FH of Kidney Dz: No FH of Diabetes: No       FH of Hypertension: Yes, father   FH of CAD: Yes, father with MI       FH of Cancer: No  FH of Kidney Cancer: " "No    Personal History:   Social History     Socioeconomic History     Marital status: Single     Spouse name: Not on file     Number of children: Not on file     Years of education: Not on file     Highest education level: Not on file   Occupational History     Not on file   Tobacco Use     Smoking status: Every Day     Packs/day: 0.50     Types: Cigarettes     Start date: 2010     Smokeless tobacco: Never   Substance and Sexual Activity     Alcohol use: Yes     Comment: 5 drinks a year     Drug use: Not Currently     Sexual activity: Not on file   Other Topics Concern     Not on file   Social History Narrative     Not on file     Social Determinants of Health     Financial Resource Strain: Not on file   Food Insecurity: Not on file   Transportation Needs: Not on file   Physical Activity: Not on file   Stress: Not on file   Social Connections: Not on file   Intimate Partner Violence: Not on file   Housing Stability: Not on file          Specific Social History:       Health Insurance Status: Yes       Employment Status: Full time  Occupation: Works at [employer]                        Living Arrangements: lives with significant other       Social Support: Yes       Presence of increased risk for disease transmission behaviors as defined by [facility] guidelines: No        Allergies:  Allergies   Allergen Reactions     Amoxicillin Rash     Latex Rash       Medications:  Current Outpatient Medications   Medication Sig     doxylamine (UNISOM) 25 MG TABS tablet Take 25 mg by mouth At Bedtime     sertraline (ZOLOFT) 100 MG tablet Take 100 mg by mouth     No current facility-administered medications for this visit.     There are no discontinued medications.      Vitals:  Vital Signs 12/1/2022 12/1/2022 12/1/2022   Systolic 121 105 104   Diastolic 89 76 67   Pulse 69 - -   Temperature - - -   Respirations - - -   Weight (LB) 174 lb - -   Height 5' 7\" - -   BMI (Calculated) 27.25 - -   Pain Score - - -   O2 98 - -       Exam: "   GENERAL APPEARANCE: alert and no distress  HENT: mouth without ulcers or lesions  LYMPHATICS: no cervical or supraclavicular nodes  RESP: lungs clear to auscultation - no rales, rhonchi or wheezes  CV: regular rhythm, normal rate, no rub, no murmur  EDEMA: no LE edema bilaterally  ABDOMEN: soft, nondistended, nontender, bowel sounds normal  MS: extremities normal - no gross deformities noted, no evidence of inflammation in joints, no muscle tenderness  SKIN: no rash  NEURO: normal strength and tone, sensory exam grossly normal, mentation intact and speech normal  PSYCH: mentation appears normal and affect normal/bright    Results:   Labs and imaging were ordered for this visit and reviewed by me.  No results found for this or any previous visit (from the past 336 hour(s)).      Again, thank you for allowing me to participate in the care of your patient.      Sincerely,    Palomo Rojas MD

## 2022-12-01 NOTE — LETTER
12/1/2022         RE: Lian Garcia  [address] [place] [address]        Dear Colleague,    Thank you for referring your patient, Lian Garcia, to the [facility]. Please see a copy of my visit note below.    Iohexol Timed Test Nursing Note    Lian Garcia comes to [facility] today for a Iohexol GFR Timed test.   Orders from Nadia Long MD were completed.    Progress Note      The following information was verified with the patient:  *Female patients are not pregnant or could not have become recently pregnant: No  *Is there a history of allergy (skin rash, swelling, ect) to :   a. Iodine (except skin reactions to Betadine): NO   b. Intravenous radio-contrast agents: NO   c. Seafood: NO     RN provided patient with educational handout regarding timed test. YES    Medication administered :   Iohexol (Omnipaque 300 mg Iodine/ ml concentration) 5 mls.  Site administered Left lower arm  Start [id number]  Stop [id number]          Patient tolerated the procedure well    Vitals were reviewed   /77 (BP Location: Right arm, Patient Position: Sitting, Cuff Size: Adult Regular)   Pulse 68   Temp 97.6  F (36.4  C) (Oral)   Resp 16   Wt 79 kg (174 lb 1.6 oz)   SpO2 99%     Discharge Plan    Discharge instructions reviewed with patient: YES  Discharge papers printed and given to patient: YES  Patient/Representative verbalized understanding, all questions answered: YES    Deb Millard, QUE          Again, thank you for allowing me to participate in the care of your patient.        Sincerely,        No name on file

## 2022-12-01 NOTE — NURSING NOTE
"Chief Complaint   Patient presents with     Transplant Donor Evaluation     Kidney. Wanting to donate to a friend.      Blood pressure 104/67, pulse 69, height 1.702 m (5' 7\"), weight 78.9 kg (174 lb), SpO2 98 %.    Tanisha Mcnamara, JEAN    "

## 2022-12-01 NOTE — PROGRESS NOTES
[facility] [place]  Consult Note     Medical record number: [id number]  YOB: 1990,     Date of Visit:  12/01/2022  Consult requested by the patient for evaluation of kidney donation candidacy.    30 yo planning to donate to friend  Helathy - on Sertraline for depression'  No abd surgery  Smoke 1/2 pack every couple of days over last 2 years. Started since age 16  Works at [employer]  Open to paired donation/advanced     Abd profile favorable    Risks of the surgical procedure including but not limited to the rare risk of mortality discussed in detail. Patient verbalized good understanding and had several pertinent questions which were answered satisfactorily.     Good candidate for donation            Assessment and Recommendations: Ms. Garcia appears to be a good candidate for kidney donation at this point in the evaluation. The following issues will need to be addressed prior to formal review:    Iohexol ordered for today for assessment of adequate kidney function for donation. Will be reviewed when resulted  Donor labs ordered for today and reviewed to include: CBC, CMP, Mg, PO4, hemoglobin A1c, lipid panel, blood type x 2, hemoglobin A1c, UA with microscopic, urine culture, urine protein/cr, INR/PTT, Quantiferon gold, hepatitis C (antibody), hepatitis B panel, HIV, treponemia, West Nile (antibody panel), CMV (antibody panel), EBV (antibody panel), pregnancy screen (for females of childbearing age), PSA (males >50yrs), HLA tissue typing, buccal swab for eplet typing  Social work consult ordered  Dietician consult ordered  Transplant nephrology consult ordered  Transplant coordinator consult ordered  KHALIDA (independent living donor advovate) consult ordered  EKG ordered for today and will be reviewed when resulted. Suitable to proceed today with this testing today:  Yes   Chest x-ray ordered for today and will be reviewed when resulted. Suitable to proceed with this testing  today:  Yes   CT angio of abdomen and pelvis for anatomical assessment. Images and report to be reviewed when resulted.  Suitable to proceed with this testing today:  Yes  After review of the above, additional testing/concerns include:  none    The majority of our visit today was spent in counseling regarding the medical and surgical risks of kidney donation, the typical markos-and post-operative experience and recovery/return to work pattern.  We also talked about post-op visits and longer term health care maintenance, as well as the implications of having one remaining kidney. This discussion included, but was not limited to rates of complications such as bleeding, infection, need for transfusion, reoperation, other organ injury, future bowel obstruction, incisional hernia, port site pain, varicocele, venous thrombosis, pulmonary embolism, renal failure, and death (3 per 10,000). At the conclusion of the visit, all questions had been answered and Ms. Garcia's candidacy for donation will be reviewed at our Multidisciplinary Donor Selection Committee.     60 min spent on the date of the encounter in chart review, patient visit,  documentation and/or discussion with other providers about the issues documented above.    .    ---------------------------------------------------------------------------------------------------    HPI: Ms. Garcia wishes to donate a kidney to friend.           NO  Personal history of cancer    []         Comment:     Personal history of kidney problems   []         Comment:   Personal history of diabetes    []         Comment:                  Bladder emptying problems (prostate, urinary retention) []         Comment:   Neck or Back problems:     []         Comment:   Constipation      []         Comment:       Frequent NSAID use:         []         Comment:       Other:        []         Comment:                Past Medical History:   Diagnosis Date     Anxiety      Depression       Henoch-Schonlein purpura (H)     Had when she was younger     Migraine      Past Surgical History:   Procedure Laterality Date     TONSILLECTOMY  2000     Family History   Problem Relation Age of Onset     Glaucoma Mother      Asthma Father      Hypertension Father      Myocardial Infarction Father      Chronic Obstructive Pulmonary Disease Father      Thyroid Disease Father      Anxiety Disorder Sister      Depression Sister      Anxiety Disorder Sister      Depression Sister      No Known Problems Maternal Grandmother      Unknown/Adopted Maternal Grandfather      Alcoholism Paternal Grandmother      Depression Paternal Grandmother      Depression Paternal Grandfather      Myocardial Infarction Paternal Grandfather      Alcoholism Paternal Grandfather      Social History     Socioeconomic History     Marital status: Single     Spouse name: Not on file     Number of children: Not on file     Years of education: Not on file     Highest education level: Not on file   Occupational History     Not on file   Tobacco Use     Smoking status: Every Day     Packs/day: 0.50     Types: Cigarettes     Start date: 2010     Smokeless tobacco: Never   Substance and Sexual Activity     Alcohol use: Yes     Comment: 5 drinks a year     Drug use: Not Currently     Sexual activity: Not on file   Other Topics Concern     Not on file   Social History Narrative     Not on file     Social Determinants of Health     Financial Resource Strain: Not on file   Food Insecurity: Not on file   Transportation Needs: Not on file   Physical Activity: Not on file   Stress: Not on file   Social Connections: Not on file   Intimate Partner Violence: Not on file   Housing Stability: Not on file       ROS:   CONSTITUTIONAL:  No fevers or chills  EYES: negative for icterus  ENT:  negative for hearing loss, tinnitus and sore throat  RESPIRATORY:  negative for cough, sputum, dyspnea  CARDIOVASCULAR:  negative for chest pain  GASTROINTESTINAL:  negative for  nausea, vomiting, diarrhea or constipation  GENITOURINARY:  negative for incontinence, dysuria, bladder emptying problems  HEME:  No easy bruising  INTEGUMENT:  negative for rash and pruritus  NEURO:  Negative for headache, seizure disorder    Allergies:   Allergies   Allergen Reactions     Amoxicillin Rash     Latex Rash       Medications:  Prescription Medications as of 12/1/2022       Rx Number Disp Refills Start End Last Dispensed Date Next Fill Date Owning Pharmacy    doxylamine (UNISOM) 25 MG TABS tablet            Sig: Take 25 mg by mouth At Bedtime    Class: Historical    Route: Oral    sertraline (ZOLOFT) 100 MG tablet    6/30/2020        Sig: Take 100 mg by mouth    Class: Historical    Route: Oral      Clinic-Administered Medications as of 12/1/2022       Dose Frequency Start End    iohexol (OMNIPAQUE) 350 MG/ML injectable solution 4 mL (Completed) 4 mL ONCE 12/1/2022 12/1/2022    Route: Intravenous        Exam:   Temp:  [97.6  F (36.4  C)] 97.6  F (36.4  C)  Pulse:  [68-69] 69  Resp:  [16] 16  BP: (104-121)/(67-89) 104/67  SpO2:  [98 %-99 %] 98 %  Appearance: in no apparent distress.   Skin: normal  Head and Neck: Normal, no rashes or jaundice  Respiratory: normal respiratory excursions, no audible wheeze  Cardiovascular: RRR  Abdomen: rounded, No surgical scars   Extremeties: Edema, none  Neuro: without deficit       Labs:   ABO: unknown  Chemistries:   Recent Labs   Lab Test 12/01/22  0644      POTASSIUM 4.1   CHLORIDE 103   CO2 27   ANIONGAP 8   GLC 94   BUN 12.3   CR 0.70   VILLA 8.9       Urine Studies:   Recent Labs   Lab Test 12/01/22  0644   COLOR Yellow   APPEARANCE Clear   URINEGLC Negative   URINEBILI Negative   URINEKETONE Negative   SG 1.032   UBLD Negative   URINEPH 5.5   PROTEIN 10*   NITRITE Negative   LEUKEST Negative   RBCU <1   WBCU 2     Recent Labs   Lab Test 12/01/22  0644   MICROL <12.0       Hematology:      Recent Labs   Lab Test 12/01/22  0644   WBC 6.9   RBC 4.99   HGB 15.0    HCT 44.7   MCV 90   MCH 30.1   MCHC 33.6   RDW 11.8          Coags:   Recent Labs   Lab Test 12/01/22  0644   INR 0.95       Lipid Profile:   Cholesterol   Date Value Ref Range Status   12/01/2022 216 (H) <200 mg/dL Final     Triglycerides   Date Value Ref Range Status   12/01/2022 79 <150 mg/dL Final     Direct Measure HDL   Date Value Ref Range Status   12/01/2022 67 >=50 mg/dL Final     LDL Cholesterol Calculated   Date Value Ref Range Status   12/01/2022 133 (H) <=100 mg/dL Final     Non HDL Cholesterol   Date Value Ref Range Status   12/01/2022 149 (H) <130 mg/dL Final       Virals:  CMV and EBV pending.   No lab results found.   No results found for: HCVAB, HQTG, HCGENO, HCPCR, HQTRNA, HEPRNA, CRYOG    No results found for: HCVAB, HBSAB, HBSAG

## 2022-12-02 LAB
EBV VCA IGM SER IA-ACNC: <10 U/ML
EBV VCA IGM SER IA-ACNC: NORMAL
GAMMA INTERFERON BACKGROUND BLD IA-ACNC: 0.04 IU/ML
M TB IFN-G BLD-IMP: NEGATIVE
M TB IFN-G CD4+ BCKGRND COR BLD-ACNC: 9.96 IU/ML
MITOGEN IGNF BCKGRD COR BLD-ACNC: 0.01 IU/ML
MITOGEN IGNF BCKGRD COR BLD-ACNC: 0.01 IU/ML
QUANTIFERON MITOGEN: 10 IU/ML
QUANTIFERON NIL TUBE: 0.04 IU/ML
QUANTIFERON TB1 TUBE: 0.05 IU/ML
QUANTIFERON TB2 TUBE: 0.05

## 2022-12-02 NOTE — PROGRESS NOTES
Psychosocial Evaluation  Living Organ Donation per OPTN Policy 14.1.A  Organ Type: living kidney donor   Presenting Information: Lian presents to the [facility], [facility] Transplant [facility] to complete a psychosocial evaluation since she is interested in becoming a living kidney donor for her friend/coworker that she has known for about 5 years.    PERSONAL BACKGROUND:  Current Living Situation: Lian lives in [place], [place] with her boyfriend, Leonardo, and their pets.     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 She has talked to her manager about the possibility of donation and they are supportive of her. Would be interested in [employer] Donor Shield Reimbursement.     Health Insurance Status: has E[employer] through work ([employer])     Family History: Lian is single and does not have children. She has 2 siblings and both parents are living. Grew up in [place]. Lives with her boyfriend.     General Health: Lian's PCP resigned, but recently had a physical. Does not have a health care directive.     Mental Health: Lian states today that she has had depression for as long as she can remember, even as a child. Her symptoms of depression are isolating herself, not sleeping. She has tried difference medications in the past and gone off and on them (due to insurance, per chart review), but has been consistent on her current regime of sertraline for about the last 3 years. She feels that this controls her depression very well. In 2018, chart indicates that she had fleeting suicidal ideation and SW inquired about this-- Lian confirms that she had some SI without a plan to hurt herself. Denies past suicide attempts or ideation since that time. Lian was engaged in therapy from Oct 2021 until earlier this year, but it became too expensive for her to continue. Would be willing  "to return to therapy-- SW asserted that it would be beneficial for Lian to do this as she thinks about being a living kidney donor.     Denies bipolar disorder, or disorders of thought such as schizophrenia or schizoaffective disorder.  There is no history of personality disorder or eating disorders. The donor denies any past history of hospitalization for psychiatric illnesses.  The donor denies any past history of ADHD or ADD.  The donor denies any history of educational issues or need for special educational services in their past history.    Alcohol and Drug Use/Abuse/Dependency: Lian reports that she  consumes approximately 1 serving of alcohol per year ( a serving is defined here as one, 12 oz beer, or one 4 oz glass of wine, or one 1 /2 oz of hard liquor).  The donor denies any past history of abuse or dependency on alcohol or illicit drugs. The donor denies any current use of street drugs, including marijuana, vaping, edible marijuana, or other mood altering substances.  The donor denies any past history of negative consequences of use of alcohol or drugs such as a DUI, relationship problems, problems with fulfilling parenting or other care giving responsibilities, or problems with work performance.     Cigarette Use: Lian smokes about 1/2 ppd and has for 15 years. Not interested in quitting at this time.     Legal: None     Coping with major surgery/associated stress: taking a nap, compartmentalize     Support System: Leonardo, her SO, and her neighbor would be her support post operatively     DONOR SPECIFIC INFORMATION:  Relationship to Recipient: friend/coworker     Decision Process/Motivation to Donate: Lian reports that it felt \"obvious\" to her to come forward as a donor. She cannot think about \"watching someone die.\" She has thought about organ donation in the past and wants to help her friend of 5 years get off dialysis.     High risk behaviors as defined by [place] Public Health Services (PHS) that have " potential to increase risk of disease transmission were reviewed and no risks identified.     PREPARATION FOR DONATION, RECOVERY, AND POTENTIAL SHORT-LONG-TERM OUTCOMES:  Understanding of the Living Donation Process:  We discussed the role of living donor .  Short and long term medical and psychosocial risks to both, donor and recipient were reviewed and she expressed understanding.  Post surgical restrictions (2 weeks no driving, 6 weeks no lifting over 10 lbs) were reviewed and she appears capable of adhering to the post surgical requirements. The need for a caregiver was discussed and she has support .  The risk of poor psychosocial outcome including problems with body image, post-surgery depression or anxiety, or feelings of emotional distress or bereavement if recipient experiences any recurrent disease, poor outcome or death was reviewed.  Additionally, potential financial implications, including the risk of having difficulty obtaining health care insurance, life insurance, disability insurance, or long term care insurance were reviewed, as were available donor grants to assist with donor related expenses.      We also discussed some unique issues that arise with paired kidney donation, which include the uncertainty of the timing and the importance of having a employment situation and support system that is able to provide sustained support and flexibility.    She appears capable of understanding this information and making an informed medical decision.    Impressions/Recommendations:   Lian is highly motivated to donate a kidney to her friend. Her decision to donate is free of inducement, coercion, or other undue pressure. Her housing, finances and employment are stable. Lian has longstanding depression that appears to be mostly controlled with her medication, however, Lian agrees that engaging in therapy would be helpful-- she should do so as a part of living donor work up. She smokes  cigarettes and is not interested in quitting long term. The need for a caregiver was reviewed and she is able to identify a plan to meet her post operative care needs.  She appears capable of making an informed medical decision.     Lian should re-engage in MH therapy in conjunction with her psychotropic medications to optimize her MH prior to moving forward as a living donor.     Contact Information:   Bettie Walker [facility], [facility]   Living Donor   [facility], [facility], [facility]  Direct: [phone]  E-Mail: [email or web address]    Time Spent: 40 minutes

## 2022-12-03 LAB
WNV IGG SER IA-ACNC: 0.45 IV
WNV IGM SER IA-ACNC: 0 IV

## 2022-12-06 ENCOUNTER — DOCUMENTATION ONLY (OUTPATIENT)
Dept: TRANSPLANT | Facility: CLINIC | Age: 32
End: 2022-12-06

## 2022-12-06 LAB
BSA: 1.95 M2
IOHEXOL CL UR+SERPL-VRATE: 100 ML/MIN
IOHEXOL CL UR+SERPL-VRATE: 3.57 MG/DL
IOHEXOL CL UR+SERPL-VRATE: 7.69 MG/DL
IOHEXOL CL UR+SERPL-VRATE: 89 /1.73 M2

## 2022-12-06 NOTE — PROGRESS NOTES
Image Review Meeting    ATTENDEES: Dr. Quintanilla, Michelle Sales, Kathy Simmons, Radha Escoto, Berenice Le, Beverly Jones, Daisy Camacho    IMAGES REVIEWED: CTA renal from 12/1/22    IMPRESSION:   1. RIGHT KIDNEY:       A. The right kidney contains a single renal artery, a single  renal vein, and a single ureter.       B. The right kidney parenchyma is normal.        C. The renal volume is 149.8 cc.     2. LEFT KIDNEY:       A. The left kidney contains two renal arteries, two renal veins  with the inferior renal vein having a retroaortic course, and a single  ureter.       B. The left kidney parenchyma is normal.        C. The renal volume is 146.5 cc.    DECISION: RIGHT but will likely end up with 2 arteries.     INCIDENTALS: No

## 2022-12-07 ENCOUNTER — TELEPHONE (OUTPATIENT)
Dept: TRANSPLANT | Facility: CLINIC | Age: 32
End: 2022-12-07

## 2022-12-07 ENCOUNTER — COMMITTEE REVIEW (OUTPATIENT)
Dept: TRANSPLANT | Facility: CLINIC | Age: 32
End: 2022-12-07

## 2022-12-07 ENCOUNTER — DOCUMENTATION ONLY (OUTPATIENT)
Dept: TRANSPLANT | Facility: CLINIC | Age: 32
End: 2022-12-07

## 2022-12-07 LAB
ATRIAL RATE - MUSE: 69 BPM
DIASTOLIC BLOOD PRESSURE - MUSE: NORMAL MMHG
INTERPRETATION ECG - MUSE: NORMAL
P AXIS - MUSE: 43 DEGREES
PR INTERVAL - MUSE: 136 MS
QRS DURATION - MUSE: 84 MS
QT - MUSE: 402 MS
QTC - MUSE: 430 MS
R AXIS - MUSE: 62 DEGREES
SYSTOLIC BLOOD PRESSURE - MUSE: NORMAL MMHG
T AXIS - MUSE: 23 DEGREES
VENTRICULAR RATE- MUSE: 69 BPM

## 2022-12-07 NOTE — COMMITTEE REVIEW
Living Donor Committee Review Note Evaluation Date: 12/1/2022  Committee Review Date: 12/7/2022    Donor being evaluated for: Kidney    Transplant Phase: Evaluation  Transplant Status: Active    Transplant Coordinator: Daisy Guillory  Transplant Surgeon:       Committee Review Members:  Independent Living Donor Advocate Jesica Chapman, [facility], Ruth Multani, [facility]   Nephrology Palomo Rojas MD, Nadia Jimenez MD, Edu Wilder MD   Nutrition Denisha Ramirez, [facility]   Pharmacist Pavithra Whitaker, [facility]    - Clinical Bettie Walker   Transplant Albina Lilly Miranda, RN, Romana Velasco, RN, Mariluz Truong, RN, Gloria Huang, RN, Rica Barrera LPN, Kristina Soriano, QUE, Michelle Sales, QUE, Daisy Martinez, RN   Transplant Surgery Pj Quintanilla MD, Choco Starr MD       Transplant Eligibility: Acceptable Physical Health, Acceptable Mental Health    Committee Review Decision: Needs Re-presentation    Relative Contraindications: None    Absolute Contraindications: None    Committee Chair Pj Quintanilla MD verbally attested to the committee's decision.    Committee Discussion Details:   1. Will need to meet with Dietician since she was gone on [facility] eval day   2. Lian will need to go back to therapy   3. Needs to complete echo  4. Needs to see neurology for her migraines and headaches

## 2022-12-07 NOTE — PROGRESS NOTES
SW provided info/resources on locating MH therapy and cost assistance.     Bettie Walker, [facility], CCTSW   Living Donor   [facility], [facility], [facility]  Direct: [phone]  E-Mail: [email or web address]

## 2022-12-07 NOTE — TELEPHONE ENCOUNTER
Spoke to Lian regarding committee review results:    Committee Discussion Details:   1. Will need to meet with Dietician since she was gone on [facility] eval day   2. Lian will need to go back to therapy   3. Needs to complete echo  4. Needs to see neurology for her migraines and headaches

## 2022-12-12 LAB
A*LOCUS SEROLOGIC EQUIVALENT: 11
A*LOCUS: NORMAL
ABTEST METHOD: NORMAL
B*: NORMAL
B*LOCUS SEROLOGIC EQUIVALENT: 7
B*LOCUS: NORMAL
B*SEROLOGIC EQUIVALENT: 55
BW-1: NORMAL
C*: NORMAL
C*LOCUS SEROLOGIC EQUIVALENT: 9
C*LOCUS: NORMAL
C*SEROLOGIC EQUIVALENT: 7
DPA1*: NORMAL
DPB1*: NORMAL
DPB1*LOCUS NMDP: NORMAL
DPB1*LOCUS: NORMAL
DPB1*NMDP: NORMAL
DQA1*: NORMAL
DQA1*LOCUS: NORMAL
DQB1*: NORMAL
DQB1*LOCUS SEROLOGIC EQUIVALENT: 8
DQB1*LOCUS: NORMAL
DQB1*SEROLOGIC EQUIVALENT: 6
DRB1*: NORMAL
DRB1*LOCUS SEROLOGIC EQUIVALENT: 4
DRB1*LOCUS: NORMAL
DRB1*SEROLOGIC EQUIVALENT: 15
DRB4*LOCUS SEROLOGIC EQUIVALENT: 53
DRB4*LOCUS: NORMAL
DRB5*: NORMAL
DRB5*SEROLOGIC EQUIVALENT: 51
DRSSO TEST METHOD: NORMAL

## 2023-01-06 DIAGNOSIS — Z00.5 TRANSPLANT DONOR EVALUATION: Primary | ICD-10-CM

## 2023-02-04 ENCOUNTER — HEALTH MAINTENANCE LETTER (OUTPATIENT)
Age: 33
End: 2023-02-04

## 2023-04-12 ENCOUNTER — TELEPHONE (OUTPATIENT)
Dept: TRANSPLANT | Facility: CLINIC | Age: 33
End: 2023-04-12

## 2023-04-12 ENCOUNTER — COMMITTEE REVIEW (OUTPATIENT)
Dept: TRANSPLANT | Facility: CLINIC | Age: 33
End: 2023-04-12

## 2023-04-12 NOTE — TELEPHONE ENCOUNTER
Spoke to Lian regarding committee review results:    Committee Discussion Details:   1. Will need to meet with Dietician since she was gone on [facility] eval day   2. Lian will need to go back to therapy   3. Reviewed complete echo-WNL no follow up needed  4. Needs to see neurology for her migraines and headaches

## 2023-04-12 NOTE — COMMITTEE REVIEW
Living Donor Committee Review Note Evaluation Date: 12/1/2022  Committee Review Date: 4/12/2023    Donor being evaluated for: Kidney    Transplant Phase: Evaluation  Transplant Status: Active    Transplant Coordinator: Daisy Guillory  Transplant Surgeon:       Committee Review Members:  Independent Living Donor Advocate Jesica Chapman, [facility], Ruth Multani, [facility]   Nephrology Palomo Rojas MD, Edu Wilder MD   Nutrition Denisha Ramirez,    Pharmacist Tylor Acevedo, [facility], Pavithra Whitaker, [facility]    - Clinical Bettie Walker   Transplant Albina Lilly Miranda, RN, Mariluz Truong, RN, Radha Peterson, NP, Gloria Huang, QUE, Rica Barrera LPN, Kristina Soriano, QUE, Michelle Sales, QUE, Daisy Martinez, RN   Transplant Surgery Mickey Wan MD       Transplant Eligibility: Acceptable Physical Health, Acceptable Mental Health    Committee Review Decision: Needs Re-presentation    Relative Contraindications: None    Absolute Contraindications: None    Committee Chair Mickey Wan MD verbally attested to the committee's decision.    Committee Discussion Details:   1. Will need to meet with Dietician since she was gone on [facility] eval day   2. Lian will need to go back to therapy   3. Reviewed complete echo-WNL no follow up needed  4. Needs to see neurology for her migraines and headaches

## 2023-07-20 ENCOUNTER — DOCUMENTATION ONLY (OUTPATIENT)
Dept: TRANSPLANT | Facility: CLINIC | Age: 33
End: 2023-07-20

## 2023-10-14 ENCOUNTER — HEALTH MAINTENANCE LETTER (OUTPATIENT)
Age: 33
End: 2023-10-14

## 2024-12-07 ENCOUNTER — HEALTH MAINTENANCE LETTER (OUTPATIENT)
Age: 34
End: 2024-12-07